# Patient Record
Sex: MALE | Race: ASIAN | NOT HISPANIC OR LATINO | URBAN - METROPOLITAN AREA
[De-identification: names, ages, dates, MRNs, and addresses within clinical notes are randomized per-mention and may not be internally consistent; named-entity substitution may affect disease eponyms.]

---

## 2019-06-09 ENCOUNTER — EMERGENCY (EMERGENCY)
Facility: HOSPITAL | Age: 29
LOS: 1 days | Discharge: HOME | End: 2019-06-09
Attending: EMERGENCY MEDICINE
Payer: COMMERCIAL

## 2019-06-09 VITALS
TEMPERATURE: 98 F | HEART RATE: 84 BPM | HEIGHT: 70 IN | SYSTOLIC BLOOD PRESSURE: 128 MMHG | DIASTOLIC BLOOD PRESSURE: 81 MMHG | OXYGEN SATURATION: 98 % | RESPIRATION RATE: 18 BRPM | WEIGHT: 169.98 LBS

## 2019-06-09 VITALS — OXYGEN SATURATION: 99 % | RESPIRATION RATE: 18 BRPM | SYSTOLIC BLOOD PRESSURE: 130 MMHG | HEART RATE: 77 BPM

## 2019-06-09 PROCEDURE — 90715 TDAP VACCINE 7 YRS/> IM: CPT

## 2019-06-09 PROCEDURE — 70486 CT MAXILLOFACIAL W/O DYE: CPT | Mod: 26

## 2019-06-09 PROCEDURE — 90471 IMMUNIZATION ADMIN: CPT

## 2019-06-09 PROCEDURE — 72125 CT NECK SPINE W/O DYE: CPT | Mod: 26

## 2019-06-09 PROCEDURE — 99284 EMERGENCY DEPT VISIT MOD MDM: CPT | Mod: 25

## 2019-06-09 PROCEDURE — 73620 X-RAY EXAM OF FOOT: CPT

## 2019-06-09 PROCEDURE — 70450 CT HEAD/BRAIN W/O DYE: CPT | Mod: 26

## 2019-06-09 PROCEDURE — 70486 CT MAXILLOFACIAL W/O DYE: CPT

## 2019-06-09 PROCEDURE — 70450 CT HEAD/BRAIN W/O DYE: CPT

## 2019-06-09 PROCEDURE — 76377 3D RENDER W/INTRP POSTPROCES: CPT | Mod: 26

## 2019-06-09 PROCEDURE — 76377 3D RENDER W/INTRP POSTPROCES: CPT

## 2019-06-09 PROCEDURE — 72125 CT NECK SPINE W/O DYE: CPT

## 2019-06-09 PROCEDURE — 28660 TREAT TOE DISLOCATION: CPT

## 2019-06-09 PROCEDURE — 28660 TREAT TOE DISLOCATION: CPT | Mod: 54

## 2019-06-09 PROCEDURE — 73620 X-RAY EXAM OF FOOT: CPT | Mod: 26,LT

## 2019-06-09 RX ORDER — ACETAMINOPHEN 500 MG
650 TABLET ORAL ONCE
Refills: 0 | Status: COMPLETED | OUTPATIENT
Start: 2019-06-09 | End: 2019-06-09

## 2019-06-09 RX ORDER — TETANUS TOXOID, REDUCED DIPHTHERIA TOXOID AND ACELLULAR PERTUSSIS VACCINE, ADSORBED 5; 2.5; 8; 8; 2.5 [IU]/.5ML; [IU]/.5ML; UG/.5ML; UG/.5ML; UG/.5ML
0.5 SUSPENSION INTRAMUSCULAR ONCE
Refills: 0 | Status: COMPLETED | OUTPATIENT
Start: 2019-06-09 | End: 2019-06-09

## 2019-06-09 RX ADMIN — TETANUS TOXOID, REDUCED DIPHTHERIA TOXOID AND ACELLULAR PERTUSSIS VACCINE, ADSORBED 0.5 MILLILITER(S): 5; 2.5; 8; 8; 2.5 SUSPENSION INTRAMUSCULAR at 18:23

## 2019-06-09 RX ADMIN — Medication 650 MILLIGRAM(S): at 18:23

## 2019-06-09 NOTE — ED PROVIDER NOTE - SKIN, MLM
+abrasion on the left elbow, left thigh distally, left knee, right knuckles, forehead. +swelling, tenderness, ecchymosis to supraorbital ridge on the right.
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
## 2019-06-09 NOTE — ED PROVIDER NOTE - MUSCULOSKELETAL, MLM
+left big toe deformed and tender. No midline tenderness. bilateral paraspinal tenderness to C1 region, full ROM.
